# Patient Record
Sex: MALE | Race: WHITE | NOT HISPANIC OR LATINO | URBAN - METROPOLITAN AREA
[De-identification: names, ages, dates, MRNs, and addresses within clinical notes are randomized per-mention and may not be internally consistent; named-entity substitution may affect disease eponyms.]

---

## 2017-01-21 ENCOUNTER — HOSPITAL ENCOUNTER (INPATIENT)
Facility: CLINIC | Age: 22
LOS: 2 days | Discharge: HOME | DRG: 885 | End: 2017-01-23
Attending: EMERGENCY MEDICINE | Admitting: PSYCHIATRY & NEUROLOGY
Payer: COMMERCIAL

## 2017-01-21 DIAGNOSIS — R45.851 SUICIDAL IDEATION: ICD-10-CM

## 2017-01-21 DIAGNOSIS — F41.8 DEPRESSION WITH ANXIETY: ICD-10-CM

## 2017-01-21 PROBLEM — F32.A DEPRESSION: Status: ACTIVE | Noted: 2017-01-21

## 2017-01-21 LAB
ALCOHOL BREATH TEST: 0 (ref 0–0.01)
AMPHETAMINES UR QL SCN: NORMAL
BARBITURATES UR QL: NORMAL
BENZODIAZ UR QL: NORMAL
CANNABINOIDS UR QL SCN: NORMAL
COCAINE UR QL: NORMAL
ETHANOL UR QL SCN: NORMAL
OPIATES UR QL SCN: NORMAL

## 2017-01-21 PROCEDURE — 25000132 ZZH RX MED GY IP 250 OP 250 PS 637: Performed by: PSYCHIATRY & NEUROLOGY

## 2017-01-21 PROCEDURE — 12400001 ZZH R&B MH UMMC

## 2017-01-21 PROCEDURE — 99222 1ST HOSP IP/OBS MODERATE 55: CPT | Mod: AI | Performed by: PSYCHIATRY & NEUROLOGY

## 2017-01-21 PROCEDURE — 80320 DRUG SCREEN QUANTALCOHOLS: CPT | Performed by: EMERGENCY MEDICINE

## 2017-01-21 PROCEDURE — 99284 EMERGENCY DEPT VISIT MOD MDM: CPT | Mod: Z6 | Performed by: EMERGENCY MEDICINE

## 2017-01-21 PROCEDURE — 99285 EMERGENCY DEPT VISIT HI MDM: CPT | Mod: 25

## 2017-01-21 PROCEDURE — 80307 DRUG TEST PRSMV CHEM ANLYZR: CPT | Performed by: EMERGENCY MEDICINE

## 2017-01-21 RX ORDER — ALUMINA, MAGNESIA, AND SIMETHICONE 2400; 2400; 240 MG/30ML; MG/30ML; MG/30ML
30 SUSPENSION ORAL EVERY 4 HOURS PRN
Status: DISCONTINUED | OUTPATIENT
Start: 2017-01-21 | End: 2017-01-23 | Stop reason: HOSPADM

## 2017-01-21 RX ORDER — HYDROXYZINE HYDROCHLORIDE 25 MG/1
25-50 TABLET, FILM COATED ORAL EVERY 4 HOURS PRN
Status: DISCONTINUED | OUTPATIENT
Start: 2017-01-21 | End: 2017-01-21

## 2017-01-21 RX ORDER — HYDROXYZINE HYDROCHLORIDE 25 MG/1
25-50 TABLET, FILM COATED ORAL EVERY 4 HOURS PRN
Status: DISCONTINUED | OUTPATIENT
Start: 2017-01-21 | End: 2017-01-23 | Stop reason: HOSPADM

## 2017-01-21 RX ORDER — OLANZAPINE 10 MG/1
10 TABLET ORAL
Status: DISCONTINUED | OUTPATIENT
Start: 2017-01-21 | End: 2017-01-23 | Stop reason: HOSPADM

## 2017-01-21 RX ORDER — BISACODYL 10 MG
10 SUPPOSITORY, RECTAL RECTAL DAILY PRN
Status: DISCONTINUED | OUTPATIENT
Start: 2017-01-21 | End: 2017-01-23 | Stop reason: HOSPADM

## 2017-01-21 RX ORDER — OLANZAPINE 10 MG/2ML
10 INJECTION, POWDER, FOR SOLUTION INTRAMUSCULAR
Status: DISCONTINUED | OUTPATIENT
Start: 2017-01-21 | End: 2017-01-23 | Stop reason: HOSPADM

## 2017-01-21 RX ORDER — BUSPIRONE HYDROCHLORIDE 5 MG/1
5 TABLET ORAL 3 TIMES DAILY
Status: DISCONTINUED | OUTPATIENT
Start: 2017-01-21 | End: 2017-01-23 | Stop reason: HOSPADM

## 2017-01-21 RX ORDER — ACETAMINOPHEN 325 MG/1
650 TABLET ORAL EVERY 4 HOURS PRN
Status: DISCONTINUED | OUTPATIENT
Start: 2017-01-21 | End: 2017-01-23 | Stop reason: HOSPADM

## 2017-01-21 RX ORDER — LANOLIN ALCOHOL/MO/W.PET/CERES
6 CREAM (GRAM) TOPICAL AT BEDTIME
Status: DISCONTINUED | OUTPATIENT
Start: 2017-01-21 | End: 2017-01-21

## 2017-01-21 RX ORDER — FLUOXETINE 10 MG/1
10 CAPSULE ORAL DAILY
Status: COMPLETED | OUTPATIENT
Start: 2017-01-22 | End: 2017-01-22

## 2017-01-21 RX ORDER — CITALOPRAM HYDROBROMIDE 10 MG/1
30 TABLET ORAL DAILY
Status: DISCONTINUED | OUTPATIENT
Start: 2017-01-21 | End: 2017-01-21

## 2017-01-21 RX ORDER — CITALOPRAM HYDROBROMIDE 10 MG/1
10 TABLET ORAL DAILY
Status: DISCONTINUED | OUTPATIENT
Start: 2017-01-22 | End: 2017-01-23

## 2017-01-21 RX ORDER — LANOLIN ALCOHOL/MO/W.PET/CERES
6 CREAM (GRAM) TOPICAL AT BEDTIME
Status: DISCONTINUED | OUTPATIENT
Start: 2017-01-21 | End: 2017-01-23 | Stop reason: HOSPADM

## 2017-01-21 RX ADMIN — TRAZODONE HYDROCHLORIDE 150 MG: 100 TABLET ORAL at 22:35

## 2017-01-21 RX ADMIN — CITALOPRAM HYDROBROMIDE 30 MG: 10 TABLET ORAL at 13:46

## 2017-01-21 RX ADMIN — BUSPIRONE HYDROCHLORIDE 5 MG: 5 TABLET ORAL at 22:35

## 2017-01-21 RX ADMIN — BUSPIRONE HYDROCHLORIDE 5 MG: 5 TABLET ORAL at 15:42

## 2017-01-21 RX ADMIN — MELATONIN TAB 3 MG 6 MG: 3 TAB at 22:35

## 2017-01-21 ASSESSMENT — ENCOUNTER SYMPTOMS
SHORTNESS OF BREATH: 0
SORE THROAT: 0
SLEEP DISTURBANCE: 1
VOMITING: 0
NERVOUS/ANXIOUS: 1
COUGH: 0
NAUSEA: 0
FEVER: 0
HALLUCINATIONS: 0
DYSPHORIC MOOD: 1
ABDOMINAL PAIN: 0
ARTHRALGIAS: 0
DIARRHEA: 0
CONFUSION: 0
HEADACHES: 0

## 2017-01-21 ASSESSMENT — ACTIVITIES OF DAILY LIVING (ADL)
ORAL_HYGIENE: INDEPENDENT
GROOMING: INDEPENDENT
ORAL_HYGIENE: INDEPENDENT
LAUNDRY: UNABLE TO COMPLETE
ORAL_HYGIENE: INDEPENDENT
GROOMING: INDEPENDENT
DRESS: INDEPENDENT
HYGIENE/GROOMING: INDEPENDENT

## 2017-01-21 NOTE — PROGRESS NOTES
01/21/17 0646   Patient Belongings   Did you bring any home meds/supplements to the hospital?  Yes   Disposition of meds  Sent to security/pharmacy per site process   Patient Belongings cell phone/electronics;clothing;keys;shoes;wallet;other (see comments)   Disposition of Belongings In Patient Bin in locked room #405, Debit Card sent to security, medications sent to security.   Belongings Search Yes   Clothing Search Yes   Second Staff Nathan CRUZ     Belongings in Patient Bin:  Blue Jeans, Red T-Shirt, Grey Sweatshirt, Red Nike Shoes, Keys on a [employer] Lanyard, Phone (LG), Phone , Wallet: [place] Instruction Permit, [employer] Student ID, Fishing License, u-pass card, [employer] Transit Card, Direct Deposit Receipt, [employer] Insurance Card.    Belongings Sent to Security:  [employer] Debit Card ending in [phone], Medication: Bottle of L-Theamine, 6 Unidentified Pills (4Red, 2White)    ADMISSION:  I am responsible for any personal items that are not sent to the safe or pharmacy. [facility] is not responsible for loss, theft or damage of any property in my possession.  Patient Signature _____________________ Date/Time _____________________  Staff Signature _______________________ Date/Time _____________________  2nd Staff person, if patient is unable/unwilling to sign  ___________________________________ Date/Time _____________________  DISCHARGE:  My personal items have been returned to me.   Patient Signature _____________________ Date/Time _____________________
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
## 2017-01-21 NOTE — PLAN OF CARE
"Problem: Depressive Symptoms  Goal: Depressive Symptoms  Signs and symptoms of listed problems will be absent or manageable.   Outcome: No Change  Clive Rowe is a 21 year old male admitted form the [facility] ED around 5:45 am due to depression and suicide ideation with plans to jump out of moving car or overdose with his trazadone . He was calm and pleasant upon arriving on this unit and was complaint with admission search procedure. According to report from the ED nurse, \"Pt arrived to ED with his father with thoughts of suicide. Pt reports he has had these thoughts for a week and they have gotten worse and worse. Pt stated \"I was going to take all my trazadone with a bottle of alcohol\". Pt states he has hx of tourett's, aspergers, ocd, depression/anxiety and has also been dealing with \"depersolization and derealization\" over the past 4-5 years. Pt reports he has attempted suicide a few years a go but \"it was more of a cry for help\". Pt is willing to contract for safety with speaker. Security watch initiated. 4 melatonin pills and 2 trazadone pills taken from patient and given to security\".     During conversation with writer, patient still endorse suicidal thought but contracted for safety while on this unit. He is fully alert and oriented. He had insight into his mental status. His speech was clear and coherent. No agitating behaviors observed. Writer unable to complete admission questionnaire at this time as patient request staff should allow him to sleep. Patient has not sleep for few days.         "

## 2017-01-21 NOTE — ED PROVIDER NOTES
"  History     Chief Complaint   Patient presents with     Suicidal     HPI  Jae Duffy is a 21 year old male who presents to the emergency department today due to suicidal ideation.  The patient has a past history of Tourette syndrome, Aspberger's, OCD, depression, anxiety, and insomnia.  He has been seeing a psychiatrist through the [facility] system for the past 4 years.   He does not have a therapist.  The patient reports that for the past 4 years he has had symptoms which include \"depersonalization and derealization\" and he feels like he is having a significant the more difficult time handling things.  He says he \"feels out of it 24/7\".  Over the past week he has had increasing thoughts of suicidal ideation, and has even made plans to overdose on his trazodone and to drink alcohol in an attempt to commit suicide.  He has also had thoughts of jumping out of a moving vehicle in order to kill himself.   He has not acted on these thoughts.  He had a discussion with his father this evening where he had multiple panic attacks and crying fits.   He told his dad that he does not think he can keep himself safe and asked him to bring him to a hospital.  He denies any prior hospitalizations for mental health issues.  He admits to drinking 1 shot of alcohol around midnight tonight.  Denies drug use.  He denies any hallucinations.  He admits to multiple increasing stressors including the fact that his ex-girlfriend is pregnant by another man.  The patient reports that he is having difficulty enjoying anything anymore.  He is having difficulty sleeping though this is a chronic problem for him.    I have reviewed the Medications, Allergies, Past Medical and Surgical History, and Social History in the Epic system.    Review of Systems   Constitutional: Negative for fever.   HENT: Negative for congestion and sore throat.    Respiratory: Negative for cough and shortness of breath.    Cardiovascular: Negative for chest " "pain.   Gastrointestinal: Negative for nausea, vomiting, abdominal pain and diarrhea.   Musculoskeletal: Negative for arthralgias.   Neurological: Negative for headaches.   Psychiatric/Behavioral: Positive for suicidal ideas, sleep disturbance and dysphoric mood. Negative for hallucinations and confusion. The patient is nervous/anxious.    All other systems reviewed and are negative.      Physical Exam   BP: (!) 150/106 mmHg  Pulse: 84  Temp: 98.2  F (36.8  C)  Resp: 18  Height: 177.8 cm (5' 10\")  SpO2: 100 %  Physical Exam   Constitutional: He is oriented to person, place, and time. He appears well-developed and well-nourished.   Alert, cooperative. NAD   HENT:   Head: Normocephalic.   Mouth/Throat: Oropharynx is clear and moist.   Eyes: Pupils are equal, round, and reactive to light.   Cardiovascular: Normal rate, regular rhythm and normal heart sounds.  Exam reveals no gallop.    No murmur heard.  Pulmonary/Chest: Effort normal and breath sounds normal. No respiratory distress. He has no wheezes. He has no rales.   Abdominal: Soft. Bowel sounds are normal. He exhibits no distension. There is no tenderness. There is no rebound and no guarding.   Neurological: He is alert and oriented to person, place, and time.   Skin: Skin is warm and dry.   Psychiatric:   Unremarkable appearance. Good eye contact. Normal rate and volume of voice. Reactive affect. SI with plan, no psychosis.  Perseverating about \"depersonalization and derealization\".    Nursing note and vitals reviewed.      ED Course     [unfilled]  Procedures             Critical Care time:  none               Results for orders placed or performed during the hospital encounter of 01/21/17 (from the past 24 hour(s))   Alcohol breath test POCT   Result Value Ref Range    Alcohol Breath Test 0 0.00 - 0.01   Drug abuse screen 6 urine (tox)   Result Value Ref Range    Amphetamine Qual Urine  NEG     Negative   Cutoff for a negative amphetamine is 500 ng/mL or " "less.      Barbiturates Qual Urine  NEG     Negative   Cutoff for a negative barbiturate is 200 ng/mL or less.      Benzodiazepine Qual Urine  NEG     Negative   Cutoff for a negative benzodiazepine is 200 ng/mL or less.      Cannabinoids Qual Urine  NEG     Negative   Cutoff for a negative cannabinoid is 50 ng/mL or less.      Cocaine Qual Urine  NEG     Negative   Cutoff for a negative cocaine is 300 ng/mL or less.      Ethanol Qual Urine  NEG     Negative   Cutoff for a negative urine ethanol is 0.05 g/dL or less      Opiates Qualitative Urine  NEG     Negative   Cutoff for a negative opiate is 300 ng/mL or less.           Assessments & Plan (with Medical Decision Making)   Patient presents to the emergency department for suicidal ideation.  He has an extensive history of mental health issues, he does have a psychiatrist but does not have a therapist.  It sounds as if he has not been hospitalized for mental health in the past.   Now coming in with suicidal ideation with plan to overdose on trazodone.    The patient reports multiple stressors which may be triggering this.  He seems to have some insight into the situation, and repeatedly tells me that he understands that this is not a problem that is going to be fixed overnight or in a week or even a month but he does not feel safe at home.   It is interesting that he is continuing to perseverate about \"depersonalization derealization\" which has been going on for 5 years.  I have some concerns for emergent cluster B traits.  Nevertheless, as he is continuing with his suicidal ideation we will admit to psychiatry at this time.  He is voluntary and willing to be admitted.    I have reviewed the nursing notes.    I have reviewed the findings, diagnosis, plan and need for follow up with the patient.    Current Discharge Medication List          Final diagnoses:   Depression with anxiety   Suicidal ideation       1/21/2017   [facility], SCOUT, EMERGENCY " DEPARTMENT      Darling Draper MD  01/21/17 0636

## 2017-01-21 NOTE — PLAN OF CARE
Problem: General Plan of Care (Inpatient Behavioral)  Goal: Team Discussion  Team Plan:   BEHAVIORAL TEAM DISCUSSION    Continued Stay Criteria/Rationale: New pt admitted for SI  Plan: Provide a safe environment and therapeutic milieu. Encourage participation in unit programming. Develop appropriate aftercare plan.  Participants: Susan Johnson RN, Mesha Chino [facility], [facility]  Summary/Recommendation: The plan is to assess the patient for mental health and medication needs.  The patient will be prescribed medications to treat the identified symptoms.  Upon discharge the patient will be referred to services as appropriate based on the assessment.  Medical/Physical: See medical consult  Progress: Initial evaluation

## 2017-01-21 NOTE — IP AVS SNAPSHOT
[facility]    [facility] [address]    [address] [address]    Phone:  [phone]                                       After Visit Summary   1/21/2017    Jae Duffy    MRN: [id number]           After Visit Summary Signature Page     I have received my discharge instructions, and my questions have been answered. I have discussed any challenges I see with this plan with the nurse or doctor.    ..........................................................................................................................................  Patient/Patient Representative Signature      ..........................................................................................................................................  Patient Representative Print Name and Relationship to Patient    ..................................................               ................................................  Date                                            Time    ..........................................................................................................................................  Reviewed by Signature/Title    ...................................................              ..............................................  Date                                                            Time

## 2017-01-21 NOTE — IP AVS SNAPSHOT
MRN:[id number]                      After Visit Summary   1/21/2017    Jae Duffy    MRN: [id number]           Patient Information     Date Of Birth          1995        About your hospital stay     You were admitted on:  January 21, 2017 You last received care in the:  [facility]    You were discharged on:  January 23, 2017       Who to Call     For medical emergencies, please call 911.  For non-urgent questions about your medical care, please call your primary care provider or clinic, None          Attending Provider     Provider    Darling Draper MD Pavey, Thomas John, MD       Primary Care Provider    Physician No Ref-Primary       No address on file        Further instructions from your care team       Behavioral Discharge Planning and Instructions      Summary: You were admitted on 1/21/2017 to [facility] for Suicidal Ideations.  You were treated by Debra Naegele, APRN, CNS and discharged on 1/23/2017.     Disposition: Discharged to home    Main Diagnosis: Per EMR; Major Depressive D/O, Generalized Anxiety D/O, and Autism Spectrum D/O.    Health Care Follow-up Appointments:   Medication Management  Date: 02/09/17  Time: 2:15 PM      Provider: Dr. Froylan Tobin MD  [facility]  [address] [place] [id number] [phone]    Therapy Appointment: No therapy appointment has been set up at this time due to you wanting to look more into services at [place].  [facility]   [address] [place], [address]  Phone:  [phone]  Fax:  [phone]    Attend all scheduled appointments with your outpatient providers. Call at least 24 hours in advance if you need to reschedule an appointment to ensure continued access to your outpatient providers.   Major Treatments, Procedures and Findings: You were provided with: a psychiatric assessment, assessed for medical stability, medication  "evaluation and/or management, group therapy, milieu management, medical interventions and OT/Skills Group.    Symptoms to Report: feeling more aggressive, increased confusion, losing more sleep, mood getting worse or thoughts of suicide    Early warning signs can include:  increased depression or anxiety sleep disturbances increased thoughts or behaviors of suicide or self-harm     Safety and Wellness:  Take all medicines as directed.  Make no changes unless your doctor suggests them. Follow treatment recommendations.  Refrain from alcohol and non-prescribed drugs.  If there is a concern for safety, call 911.    Resources:    Crisis Intervention: 148.961.5077 or 769-978-4392 (TTY: 931.194.4318).  Call anytime for help.  National Suicide Prevention Line (www.mentalhealthmn.org): 321-341-AHJD (9342)  [facility] Crisis (COPE) Response - Adult 464 638-6309  [facility] Crisis Response - Adult 187 201-1333  Text 4 Life: txt \"LIFE\" to 37021 for immediate support and crisis intervention  Crisis text line: Text \"START\" to 176-258. Free, confidential, 24/7.  Crisis Intervention: 955.138.1502 or 593-876-1205. Call anytime for help.     The treatment team has appreciated the opportunity to work with you. Jae ,  please take care and make your recovery a daily recovery. If you have any questions or concerns our unit number is [phone].  You will be receiving a follow-up phone call within the next three days from a representative from behavioral health.  You have identified the best phone number to reach you as [phone].    Pending Results     No orders found from 1/20/2017 to 1/22/2017.            Admission Information        Provider Department Dept Phone    1/21/2017 Matheus Gray MD [facility]  [phone]      Your Vitals Were     Blood Pressure Pulse Temperature    135/85 mmHg 100 97.6  F (36.4  C) (Oral)    Respirations Height Weight    16 1.778 m (5' 10\") 88.996 kg (196 lb 3.2 oz)    BMI " "(Body Mass Index) Pulse Oximetry       28.15 kg/m2 98%       MyChart Information     [facility] lets you send messages to your doctor, view your test results, renew your prescriptions, schedule appointments and more. To sign up, go to [email or web address] . Click on \"Log in\" on the left side of the screen, which will take you to the Welcome page. Then click on \"Sign up Now\" on the right side of the page.     You will be asked to enter the access code listed below, as well as some personal information. Please follow the directions to create your username and password.     Your access code is: [email or web address]  Expires: 2017  3:59 PM     Your access code will  in 90 days. If you need help or a new code, please call your [place] clinic or [phone].        Care EveryWhere ID     This is your Care EveryWhere ID. This could be used by other organizations to access your [place] medical records  [id number]           Review of your medicines      START taking        Dose / Directions    busPIRone 5 MG tablet   Commonly known as:  BUSPAR   Used for:  Depression with anxiety        Dose:  5 mg   Take 1 tablet (5 mg) by mouth 3 times daily   Quantity:  90 tablet   Refills:  1       FLUoxetine 20 MG capsule   Commonly known as:  PROzac   Used for:  Depression with anxiety        Dose:  20 mg   Take 1 capsule (20 mg) by mouth daily   Quantity:  30 capsule   Refills:  1       hydrOXYzine 25 MG tablet   Commonly known as:  ATARAX   Used for:  Depression with anxiety        Dose:  25-50 mg   Take 1-2 tablets (25-50 mg) by mouth every 4 hours as needed for anxiety   Quantity:  120 tablet   Refills:  1         CONTINUE these medicines which have NOT CHANGED        Dose / Directions    MELATONIN PO        Dose:  6 mg   Take 6 mg by mouth At Bedtime   Refills:  0       TRAZODONE HCL PO        Dose:  150 mg   Take 150 mg by mouth At Bedtime   Refills:  0         STOP taking     CITALOPRAM HYDROBROMIDE PO              "   Where to get your medicines      These medications were sent to [facility] [place], [place] - [address]  [address] [facility] [address], [facility] [address]     Phone:  [phone]    - busPIRone 5 MG tablet  - FLUoxetine 20 MG capsule  - hydrOXYzine 25 MG tablet             Protect others around you: Learn how to safely use, store and throw away your medicines at www.disposemymeds.org.             Medication List: This is a list of all your medications and when to take them. Check marks below indicate your daily home schedule. Keep this list as a reference.      Medications           Morning Afternoon Evening Bedtime As Needed    busPIRone 5 MG tablet   Commonly known as:  BUSPAR   Take 1 tablet (5 mg) by mouth 3 times daily   Last time this was given:  5 mg on 1/23/2017  2:07 PM                                         FLUoxetine 20 MG capsule   Commonly known as:  PROzac   Take 1 capsule (20 mg) by mouth daily   Last time this was given:  20 mg on 1/23/2017  9:07 AM                                   hydrOXYzine 25 MG tablet   Commonly known as:  ATARAX   Take 1-2 tablets (25-50 mg) by mouth every 4 hours as needed for anxiety                                MELATONIN PO   Take 6 mg by mouth At Bedtime   Last time this was given:  6 mg on 1/22/2017 10:39 PM                                   TRAZODONE HCL PO   Take 150 mg by mouth At Bedtime   Last time this was given:  1/22/2017 10:39 PM
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
## 2017-01-21 NOTE — ED NOTES
"Pt arrived to ED with his father with thoughts of suicide. Pt reports he has had these thoughts for a week and they have gotten worse and worse. Pt stated \"I was going to take all my trazadone with a bottle of alcohol\". Pt states he has hx of tourett's, aspergers, ocd, depression/anxiety and has also been dealing with \"depersolization and derealization\" over the past 4-5 years. Pt reports he has attempted suicide a few years a go but \"it was more of a cry for help\". Pt is willing to contract for safety with speaker. Security watch initiated. 4 melatonin pills and 2 trazadone pills taken from patient and given to security.   "

## 2017-01-21 NOTE — PLAN OF CARE
"Problem: Depressive Symptoms  Goal: Depressive Symptoms  Patient, prior to discharge, will:  -verbalize decrease in depressive signs/symptoms  -verbalize a decrease in anxiety   -verbalize an understanding of medication regimen   -verbalize absence of SI/SIB   -develop a safety plan  -identify a support system   -will participate in coordination of discharge planning    To promote safety/ mental health    Patient identified the following   Triggers:    Wellness Strategies:    Warning Signs:      Feedback (people they would like to receive feedback from if early warning signs):  Friend(s)    Family(s):     Partner/Spouse:     Support Group Member(s):     Co-Worker(s):     Taking Action:    Ways to [place]:      Self-Reflection & Planning.  Assessed patient s progress completing forms related to Illness Management Recovery (including Personal Plan of Care, Adult Coping Plan, and My Support and Coping Plan) and assisted as needed.    Encouraged patient to continue to consider triggers, wellness strategies, early warning signs, feedback from others, actions to take to prevent relapse, and coping strategies as part of a plan to remain well after leaving the hospital.        Pt isolative and withdrawn, affect full range, endorsing depression, anxiety and states he is here in hospital because he knew he was near his breaking point. Currently denies any SI/SIB thoughts or pain. Pt cooperative and forthcoming with information, observable tics of eye blinking and repeatative audible throat clearing during moments patient was feeling somewhat uncomfortable discussing past trauma; admission profile complete.  Reassurance of safety, orientation and tour of unit given and pt remains isolative and withdrawn in room, observed writing in journal. After lunch, patient reports, \"not sure what room is mine,\" reassurance given, inform of all rooms have number and his name on outside of room. Affect is brighter on approach.  "

## 2017-01-21 NOTE — PROGRESS NOTES
Pt was wtihdrawn to room for much of the day. Pt woke up to eat lunch and to meet with the Dr and Nurse and then returned to room. Pt reported having the thoughts of SI SIB not being there anymore. Pt reported having trouble concentrating and having racing thoughts frequently.      01/21/17 1500   Behavioral Health   Hallucinations denies / not responding to hallucinations   Thinking distractable;poor concentration   Orientation person: oriented;place: oriented;date: oriented;time: oriented   Memory baseline memory   Insight insight appropriate to situation   Judgement intact   Eye Contact at examiner   Affect tense   Mood depressed;anxious   Physical Appearance/Attire attire appropriate to age and situation   Hygiene well groomed   Suicidality other (see comments)  (denies)   Self Injury other (see comment)  (denies)   Activity isolative;withdrawn   Speech clear;coherent   Medication Sensitivity no stated side effects   Psychomotor / Gait balanced;steady   Psycho Education   Type of Intervention 1:1 intervention   Response participates, initiates socially appropriate   Hours 0.5   Treatment Detail Ways to [place]   Activities of Daily Living   Hygiene/Grooming independent   Oral Hygiene independent   Dress independent   Room Organization independent

## 2017-01-22 PROCEDURE — 25000132 ZZH RX MED GY IP 250 OP 250 PS 637: Performed by: PSYCHIATRY & NEUROLOGY

## 2017-01-22 PROCEDURE — 12400001 ZZH R&B MH UMMC

## 2017-01-22 RX ADMIN — MELATONIN TAB 3 MG 6 MG: 3 TAB at 22:39

## 2017-01-22 RX ADMIN — CITALOPRAM HYDROBROMIDE 10 MG: 10 TABLET ORAL at 09:30

## 2017-01-22 RX ADMIN — TRAZODONE HYDROCHLORIDE 150 MG: 100 TABLET ORAL at 22:39

## 2017-01-22 RX ADMIN — FLUOXETINE 10 MG: 10 CAPSULE ORAL at 09:30

## 2017-01-22 RX ADMIN — BUSPIRONE HYDROCHLORIDE 5 MG: 5 TABLET ORAL at 14:19

## 2017-01-22 RX ADMIN — BUSPIRONE HYDROCHLORIDE 5 MG: 5 TABLET ORAL at 09:30

## 2017-01-22 RX ADMIN — BUSPIRONE HYDROCHLORIDE 5 MG: 5 TABLET ORAL at 22:39

## 2017-01-22 ASSESSMENT — ACTIVITIES OF DAILY LIVING (ADL)
HYGIENE/GROOMING: INDEPENDENT
GROOMING: INDEPENDENT
DRESS: INDEPENDENT
ORAL_HYGIENE: INDEPENDENT
ORAL_HYGIENE: INDEPENDENT
LAUNDRY: UNABLE TO COMPLETE
DRESS: INDEPENDENT

## 2017-01-22 NOTE — PROGRESS NOTES
01/22/17 [facility]   Behavioral Health   Hallucinations denies / not responding to hallucinations   Thinking distractable   Orientation person: oriented;place: oriented   Memory baseline memory   Insight insight appropriate to events   Judgement intact   Eye Contact at examiner   Affect blunted, flat;tense   Mood mood is calm   Physical Appearance/Attire attire appropriate to age and situation;appears stated age   Hygiene well groomed   Suicidality other (see comments)  (pt denies)   Self Injury other (see comment)  (pt denies)   Activity withdrawn   Speech clear;coherent   Medication Sensitivity no stated side effects   Psychomotor / Gait balanced;steady   Activities of Daily Living   Hygiene/Grooming independent   Oral Hygiene independent   Dress independent   Laundry unable to complete   Room Organization independent   Behavioral Health Interventions   Depression maintain safety precautions;maintain safe secure environment;assist patient in developing safety plan;assist patient in following safety plan;provide emotional support;establish therapeutic relationship;build upon strengths   Social and Therapeutic Interventions (Depression) encourage socialization with peers;encourage effective boundaries with peers;encourage participation in therapeutic groups and milieu activities       Patient did not require seclusion/restraints to manage behavior.    Jae Duffy did not participate in groups and was visible in the milieu.    Notable mental health symptoms during this shift:distractable    Patient is working on these coping/social skills: Sharing feelings  Distraction  Positive social behaviors  Asking for help    Visitors during this shift included NA.     Other information about this shift: Pt was visible in the milieu and was active in some groups. Pt was pleasant with staff and respected boundaries with peers. Pt did not socialize much with peers. Pt was visibly shaky and tense, but denies feeling anxious. Pt  exhibited good insight for why he was admitted into the hospital, and listed several coping skills he will use moving forward. Pt has a clear outlined plan for coping. Pt reported almost finishing his lunch, which he said was more than he's eaten recently. Pt also stated he slept well through the night. Pt denied feeling anxious or depressed. Pt denied thoughts of SI. Pt was able to shower, and verbally expressed his desire to isolate himself less.

## 2017-01-22 NOTE — PROGRESS NOTES
Pt submitted a 12-hour intent to leave at 1235 today. At shift change, pt was strongly encouraged to stay and talk to his doctor tomorrow as there was a very high likelihood of being placed on a 72 hold, which could delay his discharge. Pt rescinded his request. Pt states that he feels ready to discharge and wants to get back to school. Pt reports that he has a good support system, coping skills and an outpatient provider in place. 12 hour intent document placed in pt chart.

## 2017-01-22 NOTE — PROGRESS NOTES
01/21/17 2036   Behavioral Health   Hallucinations denies / not responding to hallucinations   Thinking other (see comment)  ([place])   Orientation person: oriented   Memory baseline memory   Insight insight appropriate to events   Judgement intact   Eye Contact at examiner   Affect blunted, flat   Mood mood is calm;depressed   Physical Appearance/Attire appears stated age;attire appropriate to age and situation   Hygiene well groomed   Suicidality other (see comments)  (Pt denies)   Self Injury other (see comment)  (Pt denies)   Activity isolative   Speech clear;coherent   Psychomotor / Gait balanced;steady   Sleep/Rest/Relaxation   Day/Evening Time Hours up all shift   Coping/Psychosocial   Verbalized Emotional State acceptance;depression   Activities of Daily Living   Hygiene/Grooming independent   Oral Hygiene independent   Dress independent   Room Organization independent   Behavioral Health Interventions   Depression maintain safety precautions;maintain safe secure environment;provide emotional support;encourage participation / independence with adls;establish therapeutic relationship;build upon strengths   Social and Therapeutic Interventions (Depression) encourage socialization with peers;encourage effective boundaries with peers;encourage participation in therapeutic groups and milieu activities   Patient did not participate in group activities. He reported that he feels fine, but depressed. He stated that he has some coping skills such as taking a walk, writing, music etc, but has not been following up with those coping skills. Staff encouraged pt to participate in therapeutic groups. Patient shares that he will like to start therapy when discharged, and may need discuss his options with his doctor and . Overall, Jae appears flat, but brightens up on approach, isolates in his room, spends more time  in his room, writing, and agrees with redirections.

## 2017-01-23 VITALS
RESPIRATION RATE: 16 BRPM | HEART RATE: 100 BPM | OXYGEN SATURATION: 98 % | WEIGHT: 196.2 LBS | HEIGHT: 70 IN | TEMPERATURE: 97.6 F | DIASTOLIC BLOOD PRESSURE: 85 MMHG | SYSTOLIC BLOOD PRESSURE: 135 MMHG | BODY MASS INDEX: 28.09 KG/M2

## 2017-01-23 LAB
ALBUMIN SERPL-MCNC: 3.9 G/DL (ref 3.4–5)
ALP SERPL-CCNC: 72 U/L (ref 40–150)
ALT SERPL W P-5'-P-CCNC: 39 U/L (ref 0–70)
ANION GAP SERPL CALCULATED.3IONS-SCNC: 6 MMOL/L (ref 3–14)
AST SERPL W P-5'-P-CCNC: 20 U/L (ref 0–45)
BASOPHILS # BLD AUTO: 0.1 10E9/L (ref 0–0.2)
BASOPHILS NFR BLD AUTO: 0.6 %
BILIRUB SERPL-MCNC: 0.7 MG/DL (ref 0.2–1.3)
BUN SERPL-MCNC: 16 MG/DL (ref 7–30)
CALCIUM SERPL-MCNC: 8.8 MG/DL (ref 8.5–10.1)
CHLORIDE SERPL-SCNC: 106 MMOL/L (ref 94–109)
CO2 SERPL-SCNC: 29 MMOL/L (ref 20–32)
CREAT SERPL-MCNC: 0.95 MG/DL (ref 0.66–1.25)
DIFFERENTIAL METHOD BLD: ABNORMAL
EOSINOPHIL # BLD AUTO: 0.2 10E9/L (ref 0–0.7)
EOSINOPHIL NFR BLD AUTO: 1.8 %
ERYTHROCYTE [DISTWIDTH] IN BLOOD BY AUTOMATED COUNT: 11.8 % (ref 10–15)
GFR SERPL CREATININE-BSD FRML MDRD: NORMAL ML/MIN/1.7M2
GLUCOSE SERPL-MCNC: 89 MG/DL (ref 70–99)
HCT VFR BLD AUTO: 45.1 % (ref 40–53)
HGB BLD-MCNC: 16.2 G/DL (ref 13.3–17.7)
IMM GRANULOCYTES # BLD: 0 10E9/L (ref 0–0.4)
IMM GRANULOCYTES NFR BLD: 0.5 %
LYMPHOCYTES # BLD AUTO: 2.4 10E9/L (ref 0.8–5.3)
LYMPHOCYTES NFR BLD AUTO: 28.8 %
MCH RBC QN AUTO: 34.4 PG (ref 26.5–33)
MCHC RBC AUTO-ENTMCNC: 35.9 G/DL (ref 31.5–36.5)
MCV RBC AUTO: 96 FL (ref 78–100)
MONOCYTES # BLD AUTO: 0.9 10E9/L (ref 0–1.3)
MONOCYTES NFR BLD AUTO: 10.8 %
NEUTROPHILS # BLD AUTO: 4.8 10E9/L (ref 1.6–8.3)
NEUTROPHILS NFR BLD AUTO: 57.5 %
NRBC # BLD AUTO: 0 10*3/UL
NRBC BLD AUTO-RTO: 0 /100
PLATELET # BLD AUTO: 175 10E9/L (ref 150–450)
POTASSIUM SERPL-SCNC: 4.1 MMOL/L (ref 3.4–5.3)
PROT SERPL-MCNC: 7.2 G/DL (ref 6.8–8.8)
RBC # BLD AUTO: 4.71 10E12/L (ref 4.4–5.9)
SODIUM SERPL-SCNC: 141 MMOL/L (ref 133–144)
TSH SERPL DL<=0.005 MIU/L-ACNC: 1.36 MU/L (ref 0.4–4)
WBC # BLD AUTO: 8.3 10E9/L (ref 4–11)

## 2017-01-23 PROCEDURE — 25000132 ZZH RX MED GY IP 250 OP 250 PS 637: Performed by: PSYCHIATRY & NEUROLOGY

## 2017-01-23 PROCEDURE — 85025 COMPLETE CBC W/AUTO DIFF WBC: CPT | Performed by: PSYCHIATRY & NEUROLOGY

## 2017-01-23 PROCEDURE — 36415 COLL VENOUS BLD VENIPUNCTURE: CPT | Performed by: PSYCHIATRY & NEUROLOGY

## 2017-01-23 PROCEDURE — 80053 COMPREHEN METABOLIC PANEL: CPT | Performed by: PSYCHIATRY & NEUROLOGY

## 2017-01-23 PROCEDURE — 97150 GROUP THERAPEUTIC PROCEDURES: CPT | Mod: GO

## 2017-01-23 PROCEDURE — 90853 GROUP PSYCHOTHERAPY: CPT

## 2017-01-23 PROCEDURE — 99239 HOSP IP/OBS DSCHRG MGMT >30: CPT | Performed by: CLINICAL NURSE SPECIALIST

## 2017-01-23 PROCEDURE — 84443 ASSAY THYROID STIM HORMONE: CPT | Performed by: PSYCHIATRY & NEUROLOGY

## 2017-01-23 PROCEDURE — 12400001 ZZH R&B MH UMMC

## 2017-01-23 RX ORDER — BUSPIRONE HYDROCHLORIDE 5 MG/1
5 TABLET ORAL 3 TIMES DAILY
Qty: 90 TABLET | Refills: 1 | Status: SHIPPED | OUTPATIENT
Start: 2017-01-23

## 2017-01-23 RX ORDER — HYDROXYZINE HYDROCHLORIDE 25 MG/1
25-50 TABLET, FILM COATED ORAL EVERY 4 HOURS PRN
Qty: 120 TABLET | Refills: 1 | Status: SHIPPED | OUTPATIENT
Start: 2017-01-23

## 2017-01-23 RX ADMIN — BUSPIRONE HYDROCHLORIDE 5 MG: 5 TABLET ORAL at 09:07

## 2017-01-23 RX ADMIN — CITALOPRAM HYDROBROMIDE 10 MG: 10 TABLET ORAL at 09:07

## 2017-01-23 RX ADMIN — BUSPIRONE HYDROCHLORIDE 5 MG: 5 TABLET ORAL at 14:07

## 2017-01-23 RX ADMIN — FLUOXETINE 20 MG: 20 CAPSULE ORAL at 09:07

## 2017-01-23 ASSESSMENT — ACTIVITIES OF DAILY LIVING (ADL)
ORAL_HYGIENE: INDEPENDENT
DRESS: STREET CLOTHES;INDEPENDENT
DRESS: STREET CLOTHES;INDEPENDENT
GROOMING: HANDWASHING;SHOWER;INDEPENDENT
GROOMING: HANDWASHING;INDEPENDENT
LAUNDRY: WITH SUPERVISION
ORAL_HYGIENE: INDEPENDENT

## 2017-01-23 NOTE — PROGRESS NOTES
"   01/23/17 1600   Occupational Therapy   Type of Intervention structured groups   Response Participates   Hours 3   Pt. Attended 3 of 3 scheduled OT sessions today.   Observations: pt had positive affect and contributed to group with insightful information during discussion. Pt identified barriers to a balanced weekly schedule as \"studying too much and not taking any leisure time.\"   Group Description:   Pt attended and participated in a structured occupational therapy group session. Therapist facilitated discussion and educated group on the health benefits and practice of positive affirmations. With handout provided pt identified or created positive affirmations and decorated a wooden craft chest box for the affirmations.   Pt participated in OT clinic, working on completing a self-initiated project facilitated by OT and graded to appropriate functional performance.   Pt attended and participated in a structured occupational therapy group session. Pt's problem solved as a group in an activity involving creating a week schedule using suggested and average time spent in the various occupations of a student. Pt identified barriers to and health benefits of a having a balanced schedule.    "

## 2017-01-23 NOTE — DISCHARGE INSTRUCTIONS
Behavioral Discharge Planning and Instructions      Summary: You were admitted on 1/21/2017 to Station [address] [place] [address] for Suicidal Ideations.  You were treated by Debra Naegele, APRN, CNS and discharged on 1/23/2017.     Disposition: Discharged to home    Main Diagnosis: Per EMR; Major Depressive D/O, Generalized Anxiety D/O, and Autism Spectrum D/O.    Health Care Follow-up Appointments:   Medication Management  Date: 02/09/17  Time: 2:15 PM      Provider: Dr. Froylan Tobin MD  [facility]  [address] [place] [id number] [phone]    Therapy Appointment: No therapy appointment has been set up at this time due to you wanting to look more into services at [place].  [facility]   [address] [place], [place], [address]  Phone:  [phone]  Fax:  [phone]    Attend all scheduled appointments with your outpatient providers. Call at least 24 hours in advance if you need to reschedule an appointment to ensure continued access to your outpatient providers.   Major Treatments, Procedures and Findings: You were provided with: a psychiatric assessment, assessed for medical stability, medication evaluation and/or management, group therapy, milieu management, medical interventions and OT/Skills Group.    Symptoms to Report: feeling more aggressive, increased confusion, losing more sleep, mood getting worse or thoughts of suicide    Early warning signs can include:  increased depression or anxiety sleep disturbances increased thoughts or behaviors of suicide or self-harm     Safety and Wellness:  Take all medicines as directed.  Make no changes unless your doctor suggests them. Follow treatment recommendations.  Refrain from alcohol and non-prescribed drugs.  If there is a concern for safety, call 911.    Resources:    Crisis Intervention: 919.159.7387 or 004-451-7747 (TTY: 250.569.2097).  Call anytime for help.  National Suicide Prevention Line  "(www.mentalhealthmn.org): 531-647-GYXW (3140)  [facility] Crisis (COPE) Response - Adult 977 419-5963  [facility] Crisis Response - Adult 050 100-2260  Text 4 Life: txt \"LIFE\" to [address]145 for immediate support and crisis intervention  Crisis text line: Text \"START\" to 711-885. Free, confidential, 24/7.  Crisis Intervention: 705.347.6600 or 335-244-1800. Call anytime for help.     The treatment team has appreciated the opportunity to work with you. Jae ,  please take care and make your recovery a daily recovery. If you have any questions or concerns our unit number is [phone].  You will be receiving a follow-up phone call within the next three days from a representative from behavioral health.  You have identified the best phone number to reach you as [phone].  "

## 2017-01-23 NOTE — PROGRESS NOTES
01/22/17 2234   Behavioral Health   Hallucinations denies / not responding to hallucinations   Thinking distractable   Orientation person: oriented;place: oriented;date: oriented;time: oriented   Memory baseline memory   Insight insight appropriate to situation   Judgement intact   Eye Contact at examiner   Affect blunted, flat   Mood mood is calm   Physical Appearance/Attire attire appropriate to age and situation   Hygiene well groomed   Suicidality other (see comments)  (denies)   Self Injury other (see comment)  (denies)   Activity withdrawn;isolative   Speech clear;coherent   Psychomotor / Gait balanced   Psycho Education   Type of Intervention 1:1 intervention   Response participates with encouragement   Hours 0.5   Treatment Detail check in   Activities of Daily Living   Hygiene/Grooming independent   Oral Hygiene independent   Dress independent   Room Organization independent     Patient spent most of the time on the milieu, withdrawn, did attend and participated in group. Goal was to maintain positive attitude, Denies SI/SIB, hopeful, had no concerns just thinking about coping with anxiety.

## 2017-01-23 NOTE — DISCHARGE SUMMARY
Psychiatric Discharge Summary    Jae Duffy MRN# [id number]   Age: 21 year old YOB: 1995     Date of Admission:  1/21/2017  Date of Discharge:  1/23/2017  Admitting Physician:  Matheus Gray MD  Discharge Physician:  Debra Naegele APRN, CNS (Contact: [phone])         Event Leading to Hospitalization:   The patient has a history of self-reported depression, anxiety, OCD, PTSD and Tourette's who presented to the emergency room accompanied by family reporting concern for depressed mood and suicidal thoughts.  The patient had reported contemplating overdosing on his medications and could not contract for safety, prompting his referral to the inpatient setting.  On examination today, he recalls that over the past few years he has been coping with residual mood and me that he has suppressed memories of these experiences; however, over the past year or 2, they have progressively been resurfacing and contributing to worsening mood and anxiety symptoms.  These symptoms intensified on the day of his arrival which he is able to relate to a discord of relations with a peer whom he was seeking a romantic relationship with.  He tells me that he has been pursuing her for some time, however, vaguely implies that her ex-boyfriend may have reentered the picture and complicated matters for him.  He felt quite despondent and called his family reporting the intensity of his sadness and desire for suicide.  They assisted in transporting him to the emergency room where he was agreeable for voluntary hospitalization.             See Admission note by Jp Tariq MD on 1/21/2017 for additional details.          DIagnoses:   1.  Major depressive disorder, recurrent, severe.    2.  Generalized anxiety disorder.    3.  Autism spectrum disorder.             Labs:     Results for orders placed or performed during the hospital encounter of 01/21/17   Drug abuse screen 6 urine (tox)   Result Value Ref Range     Amphetamine Qual Urine  NEG     Negative   Cutoff for a negative amphetamine is 500 ng/mL or less.      Barbiturates Qual Urine  NEG     Negative   Cutoff for a negative barbiturate is 200 ng/mL or less.      Benzodiazepine Qual Urine  NEG     Negative   Cutoff for a negative benzodiazepine is 200 ng/mL or less.      Cannabinoids Qual Urine  NEG     Negative   Cutoff for a negative cannabinoid is 50 ng/mL or less.      Cocaine Qual Urine  NEG     Negative   Cutoff for a negative cocaine is 300 ng/mL or less.      Ethanol Qual Urine  NEG     Negative   Cutoff for a negative urine ethanol is 0.05 g/dL or less      Opiates Qualitative Urine  NEG     Negative   Cutoff for a negative opiate is 300 ng/mL or less.     CBC with platelets differential   Result Value Ref Range    WBC 8.3 4.0 - 11.0 10e9/L    RBC Count 4.71 4.4 - 5.9 10e12/L    Hemoglobin 16.2 13.3 - 17.7 g/dL    Hematocrit 45.1 40.0 - 53.0 %    MCV 96 78 - 100 fl    MCH 34.4 (H) 26.5 - 33.0 pg    MCHC 35.9 31.5 - 36.5 g/dL    RDW 11.8 10.0 - 15.0 %    Platelet Count 175 150 - 450 10e9/L    Diff Method Automated Method     % Neutrophils 57.5 %    % Lymphocytes 28.8 %    % Monocytes 10.8 %    % Eosinophils 1.8 %    % Basophils 0.6 %    % Immature Granulocytes 0.5 %    Nucleated RBCs 0 0 /100    Absolute Neutrophil 4.8 1.6 - 8.3 10e9/L    Absolute Lymphocytes 2.4 0.8 - 5.3 10e9/L    Absolute Monocytes 0.9 0.0 - 1.3 10e9/L    Absolute Eosinophils 0.2 0.0 - 0.7 10e9/L    Absolute Basophils 0.1 0.0 - 0.2 10e9/L    Abs Immature Granulocytes 0.0 0 - 0.4 10e9/L    Absolute Nucleated RBC 0.0    Comprehensive metabolic panel   Result Value Ref Range    Sodium 141 133 - 144 mmol/L    Potassium 4.1 3.4 - 5.3 mmol/L    Chloride 106 94 - 109 mmol/L    Carbon Dioxide 29 20 - 32 mmol/L    Anion Gap 6 3 - 14 mmol/L    Glucose 89 70 - 99 mg/dL    Urea Nitrogen 16 7 - 30 mg/dL    Creatinine 0.95 0.66 - 1.25 mg/dL    GFR Estimate >90  Non  GFR Calc   >60  "mL/min/1.7m2    GFR Estimate If Black >90   GFR Calc   >60 mL/min/1.7m2    Calcium 8.8 8.5 - 10.1 mg/dL    Bilirubin Total 0.7 0.2 - 1.3 mg/dL    Albumin 3.9 3.4 - 5.0 g/dL    Protein Total 7.2 6.8 - 8.8 g/dL    Alkaline Phosphatase 72 40 - 150 U/L    ALT 39 0 - 70 U/L    AST 20 0 - 45 U/L   TSH with free T4 reflex   Result Value Ref Range    TSH 1.36 0.40 - 4.00 mU/L   Alcohol breath test POCT   Result Value Ref Range    Alcohol Breath Test 0 0.00 - 0.01            Consults:   No Consults this admission.         Hospital Course:   Jae Duffy was admitted to [facility] with attending Matheus Gray MD as a voluntary patient. The patient was placed under status 15 (15 minute checks) to ensure patient safety.     Patient reports that he have suicidal ideation because he was experiencing \"suppression memories\". He was agreeable to medication changes including transitioning from Celexa to Prozac and starting Buspar for anxiety. He reports that he is interested in therapy to \"tackle my suppression memories\" . Prozac is recommended to increase to address his obssessevie thoughts.       Jae Duffy did participate in groups and was visible in the milieu.The patient's symptoms of suicidal ideation improved. After careful assessment patient denies having any suicidal or homicidal ideation. He made appointments for therapy. He is invested in improving his mental health. He has supportive parents as protective factors.     Jae Duffy was released to home. At the time of discharge Jae Duffy was determined to not be a danger to himself or others.          Discharge Medications:     Current Discharge Medication List      START taking these medications    Details   busPIRone (BUSPAR) 5 MG tablet Take 1 tablet (5 mg) by mouth 3 times daily  Qty: 90 tablet, Refills: 1    Associated Diagnoses: Depression with anxiety      hydrOXYzine (ATARAX) 25 MG tablet Take 1-2 tablets (25-50 mg) by mouth every 4 " hours as needed for anxiety  Qty: 120 tablet, Refills: 1    Associated Diagnoses: Depression with anxiety      FLUoxetine (PROZAC) 20 MG capsule Take 1 capsule (20 mg) by mouth daily  Qty: 30 capsule, Refills: 1    Associated Diagnoses: Depression with anxiety         CONTINUE these medications which have NOT CHANGED    Details   TRAZODONE HCL PO Take 150 mg by mouth At Bedtime      MELATONIN PO Take 6 mg by mouth At Bedtime         STOP taking these medications       CITALOPRAM HYDROBROMIDE PO Comments:   Reason for Stopping:                    Psychiatric Examination:   Appearance:  awake, alert and adequately groomed  Attitude:  cooperative  Eye Contact:  good  Mood:  good  Affect:  appropriate and in normal range  Speech:  clear, coherent and normal prosody  Psychomotor Behavior:  no evidence of tardive dyskinesia, dystonia, or tics  Thought Process:  linear and goal oriented  Associations:  no loose associations  Thought Content:  no evidence of suicidal ideation or homicidal ideation  Insight:  fair  Judgment:  fair  Oriented to:  time, person, and place  Attention Span and Concentration:  fair  Recent and Remote Memory:  intact  Language: Able to name objects, Able to repeat phrases and Able to read and write  Fund of Knowledge: adequate  Muscle Strength and Tone: normal  Gait and Station: Normal         Discharge Plan:   Health Care Follow-up Appointments:   Medication Management   Date: 02/09/17   Time: 2:15 PM   Provider: Dr. Froylan Tobin MD   [facility]  [address] [place] [id number] [phone]  Therapy Appointment: No therapy appointment has been set up at this time due to you wanting to look more into services at [place].   [facility]   [address] [place], [address]   Phone: [phone]   Fax: [phone]   Attend all scheduled appointments with your outpatient providers. Call at least 24 hours in advance if you need to  reschedule an appointment to ensure continued access to your outpatient providers.   Major Treatments, Procedures and Findings: You were provided with: a psychiatric assessment, assessed for medical stability, medication evaluation and/or management, group therapy, milieu management, medical interventions and OT/Skills Group.   Symptoms to Report: feeling more aggressive, increased confusion, losing more sleep, mood getting worse or thoughts of suicide   ATTESTATION:Seen by Debra Naegele APRN, CNS on 1/23/2017  Discharge time > 30 minutes

## 2017-01-23 NOTE — PROGRESS NOTES
Pt discharged to family at 1830 without incident. Pt denies SI, SIB and hallucinations and he reports that he feels safe to return home. Prior to discharge, pt was attending groups and has been appropriate and cooperative on the unit. Pt. States that he has a support system of best friend and family in place and has coping skills of music and talking things out with his supports. AVS and discharge medications reviewed with pt and he verbalized understanding. Discharge medications and belongings given to pt.

## 2017-01-23 NOTE — PLAN OF CARE
"Problem: Depressive Symptoms  Goal: Depressive Symptoms  Patient, prior to discharge, will:  -verbalize decrease in depressive signs/symptoms  -verbalize a decrease in anxiety   -verbalize an understanding of medication regimen   -verbalize absence of SI/SIB   -develop a safety plan  -identify a support system   -will participate in coordination of discharge planning    To promote safety/ mental health    Patient identified the following   Triggers:    Wellness Strategies:    Warning Signs:      Feedback (people they would like to receive feedback from if early warning signs):  Friend(s)    Family(s):     Partner/Spouse:     Support Group Member(s):     Co-Worker(s):     Taking Action:    Ways to [place]:      Self-Reflection & Planning.  Assessed patient s progress completing forms related to Illness Management Recovery (including Personal Plan of Care, Adult Coping Plan, and My Support and Coping Plan) and assisted as needed.    Encouraged patient to continue to consider triggers, wellness strategies, early warning signs, feedback from others, actions to take to prevent relapse, and coping strategies as part of a plan to remain well after leaving the hospital.        Pt is in good spirits; reports his mood is \"pretty great, hopeful, positive.\" He denies depression, rates his anxiety 1-2. Pt is going to groups and is social, in the milieu. He said he is going to talk to Jackeline, NP re going home today. His goal is \"to stay positive.\"    Problem: General Plan of Care (Inpatient Behavioral)  Goal: Individualization/Patient Specific Goal (IP Behavioral)  The patient and/or their representative will achieve their patient-specific goals related to the plan of care.    The patient-specific goals include:   Illness Management Recovery model:  Triggers.     Patient identified the following triggers which may exacerbate their illness:    1. Not using support systems  2. Not going to appts  3. Not recognizing my triggers, as they " occur

## 2017-01-24 NOTE — PROGRESS NOTES
Case Management Note  1/23/2017    [facility] tried to meet with pt to complete Plan of Care and Relapse Prevention Plan. Pt declined to do either at this time and reports he is discharging.

## 2020-10-06 ENCOUNTER — HOSPITAL ENCOUNTER (OUTPATIENT)
Dept: BEHAVIORAL HEALTH | Facility: CLINIC | Age: 25
Discharge: HOME | End: 2020-10-06
Attending: FAMILY MEDICINE | Admitting: FAMILY MEDICINE
Payer: COMMERCIAL

## 2020-10-06 PROCEDURE — 90791 PSYCH DIAGNOSTIC EVALUATION: CPT | Mod: 95 | Performed by: PSYCHOLOGIST

## 2020-10-06 RX ORDER — PRAZOSIN HYDROCHLORIDE 2 MG/1
8 CAPSULE ORAL AT BEDTIME
COMMUNITY

## 2020-10-06 RX ORDER — LAMOTRIGINE 150 MG/1
150 TABLET ORAL DAILY
COMMUNITY

## 2020-10-06 RX ORDER — ARIPIPRAZOLE 5 MG/1
5 TABLET ORAL DAILY
COMMUNITY

## 2020-10-06 ASSESSMENT — ANXIETY QUESTIONNAIRES
GAD7 TOTAL SCORE: 9
7. FEELING AFRAID AS IF SOMETHING AWFUL MIGHT HAPPEN: SEVERAL DAYS
1. FEELING NERVOUS, ANXIOUS, OR ON EDGE: NEARLY EVERY DAY
IF YOU CHECKED OFF ANY PROBLEMS ON THIS QUESTIONNAIRE, HOW DIFFICULT HAVE THESE PROBLEMS MADE IT FOR YOU TO DO YOUR WORK, TAKE CARE OF THINGS AT HOME, OR GET ALONG WITH OTHER PEOPLE: VERY DIFFICULT
5. BEING SO RESTLESS THAT IT IS HARD TO SIT STILL: NOT AT ALL
2. NOT BEING ABLE TO STOP OR CONTROL WORRYING: SEVERAL DAYS
6. BECOMING EASILY ANNOYED OR IRRITABLE: NOT AT ALL
3. WORRYING TOO MUCH ABOUT DIFFERENT THINGS: NEARLY EVERY DAY

## 2020-10-06 ASSESSMENT — PATIENT HEALTH QUESTIONNAIRE - PHQ9
5. POOR APPETITE OR OVEREATING: SEVERAL DAYS
SUM OF ALL RESPONSES TO PHQ QUESTIONS 1-9: 14

## 2020-10-06 ASSESSMENT — COLUMBIA-SUICIDE SEVERITY RATING SCALE - C-SSRS
1. IN THE PAST MONTH, HAVE YOU WISHED YOU WERE DEAD OR WISHED YOU COULD GO TO SLEEP AND NOT WAKE UP?: NO
ATTEMPT PAST THREE MONTHS: NO
ATTEMPT LIFETIME: NO
1. IN THE PAST MONTH, HAVE YOU WISHED YOU WERE DEAD OR WISHED YOU COULD GO TO SLEEP AND NOT WAKE UP?: YES
4. HAVE YOU HAD THESE THOUGHTS AND HAD SOME INTENTION OF ACTING ON THEM?: NO
6. HAVE YOU EVER DONE ANYTHING, STARTED TO DO ANYTHING, OR PREPARED TO DO ANYTHING TO END YOUR LIFE?: NO
TOTAL  NUMBER OF ABORTED OR SELF INTERRUPTED ATTEMPTS PAST LIFETIME: NO
5. HAVE YOU STARTED TO WORK OUT OR WORKED OUT THE DETAILS OF HOW TO KILL YOURSELF? DO YOU INTEND TO CARRY OUT THIS PLAN?: NO
TOTAL  NUMBER OF ABORTED OR SELF INTERRUPTED ATTEMPTS PAST 3 MONTHS: NO
2. HAVE YOU ACTUALLY HAD ANY THOUGHTS OF KILLING YOURSELF LIFETIME?: YES
TOTAL  NUMBER OF INTERRUPTED ATTEMPTS LIFETIME: NO
6. HAVE YOU EVER DONE ANYTHING, STARTED TO DO ANYTHING, OR PREPARED TO DO ANYTHING TO END YOUR LIFE?: NO
2. HAVE YOU ACTUALLY HAD ANY THOUGHTS OF KILLING YOURSELF?: NO
3. HAVE YOU BEEN THINKING ABOUT HOW YOU MIGHT KILL YOURSELF?: YES
3. HAVE YOU BEEN THINKING ABOUT HOW YOU MIGHT KILL YOURSELF?: JUMPING OFF A BRIDGE
TOTAL  NUMBER OF INTERRUPTED ATTEMPTS PAST 3 MONTHS: NO
5. HAVE YOU STARTED TO WORK OUT OR WORKED OUT THE DETAILS OF HOW TO KILL YOURSELF? DO YOU INTEND TO CARRY OUT THIS PLAN?: NO
4. HAVE YOU HAD THESE THOUGHTS AND HAD SOME INTENTION OF ACTING ON THEM?: NO
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
## 2020-10-07 ASSESSMENT — ANXIETY QUESTIONNAIRES: GAD7 TOTAL SCORE: 9
